# Patient Record
Sex: FEMALE | Race: WHITE
[De-identification: names, ages, dates, MRNs, and addresses within clinical notes are randomized per-mention and may not be internally consistent; named-entity substitution may affect disease eponyms.]

---

## 2017-09-11 ENCOUNTER — HOSPITAL ENCOUNTER (EMERGENCY)
Dept: HOSPITAL 63 - ER | Age: 82
Discharge: HOME | End: 2017-09-11
Payer: MEDICARE

## 2017-09-11 VITALS
SYSTOLIC BLOOD PRESSURE: 159 MMHG | SYSTOLIC BLOOD PRESSURE: 159 MMHG | DIASTOLIC BLOOD PRESSURE: 84 MMHG | SYSTOLIC BLOOD PRESSURE: 159 MMHG | DIASTOLIC BLOOD PRESSURE: 84 MMHG | SYSTOLIC BLOOD PRESSURE: 159 MMHG | DIASTOLIC BLOOD PRESSURE: 84 MMHG | DIASTOLIC BLOOD PRESSURE: 84 MMHG | DIASTOLIC BLOOD PRESSURE: 84 MMHG | SYSTOLIC BLOOD PRESSURE: 159 MMHG | SYSTOLIC BLOOD PRESSURE: 159 MMHG | SYSTOLIC BLOOD PRESSURE: 159 MMHG | DIASTOLIC BLOOD PRESSURE: 84 MMHG | DIASTOLIC BLOOD PRESSURE: 84 MMHG

## 2017-09-11 DIAGNOSIS — I10: ICD-10-CM

## 2017-09-11 DIAGNOSIS — R42: ICD-10-CM

## 2017-09-11 DIAGNOSIS — R19.7: Primary | ICD-10-CM

## 2017-09-11 DIAGNOSIS — E78.00: ICD-10-CM

## 2017-09-11 DIAGNOSIS — Z90.49: ICD-10-CM

## 2017-09-11 DIAGNOSIS — E11.9: ICD-10-CM

## 2017-09-11 LAB
ALBUMIN SERPL-MCNC: 3.4 G/DL (ref 3.4–5)
ALBUMIN/GLOB SERPL: 0.9 {RATIO} (ref 1–1.7)
ALP SERPL-CCNC: 78 U/L (ref 46–116)
ALT SERPL-CCNC: 17 U/L (ref 14–59)
ANION GAP SERPL CALC-SCNC: 10 MMOL/L (ref 6–14)
AST SERPL-CCNC: 15 U/L (ref 15–37)
BASOPHILS # BLD AUTO: 0 X10^3/UL (ref 0–0.2)
BASOPHILS NFR BLD: 1 % (ref 0–3)
BILIRUB SERPL-MCNC: 0.6 MG/DL (ref 0.2–1)
BUN/CREAT SERPL: 12 (ref 6–20)
CA-I SERPL ISE-MCNC: 16 MG/DL (ref 7–20)
CALCIUM SERPL-MCNC: 8.6 MG/DL (ref 8.5–10.1)
CHLORIDE SERPL-SCNC: 106 MMOL/L (ref 98–107)
CO2 SERPL-SCNC: 25 MMOL/L (ref 21–32)
CREAT SERPL-MCNC: 1.3 MG/DL (ref 0.6–1)
EOSINOPHIL NFR BLD: 0.1 X10^3/UL (ref 0–0.7)
EOSINOPHIL NFR BLD: 1 % (ref 0–3)
ERYTHROCYTE [DISTWIDTH] IN BLOOD BY AUTOMATED COUNT: 16.7 % (ref 11.5–14.5)
GFR SERPLBLD BASED ON 1.73 SQ M-ARVRAT: 39.3 ML/MIN
GLOBULIN SER-MCNC: 3.7 G/DL (ref 2.2–3.8)
GLUCOSE SERPL-MCNC: 227 MG/DL (ref 70–99)
HCT VFR BLD CALC: 36.4 % (ref 36–47)
HGB BLD-MCNC: 12.1 G/DL (ref 12–15.5)
LYMPHOCYTES # BLD: 0.9 X10^3/UL (ref 1–4.8)
LYMPHOCYTES NFR BLD AUTO: 12 % (ref 24–48)
MCH RBC QN AUTO: 29 PG (ref 25–35)
MCHC RBC AUTO-ENTMCNC: 33 G/DL (ref 31–37)
MCV RBC AUTO: 88 FL (ref 79–100)
MONO #: 0.9 X10^3/UL (ref 0–1.1)
MONOCYTES NFR BLD: 13 % (ref 0–9)
NEUT #: 5.4 X10^3UL (ref 1.8–7.7)
NEUTROPHILS NFR BLD AUTO: 74 % (ref 31–73)
PLATELET # BLD AUTO: 283 X10^3/UL (ref 140–400)
POTASSIUM SERPL-SCNC: 3.8 MMOL/L (ref 3.5–5.1)
PROT SERPL-MCNC: 7.1 G/DL (ref 6.4–8.2)
RBC # BLD AUTO: 4.13 X10^6/UL (ref 3.5–5.4)
SODIUM SERPL-SCNC: 141 MMOL/L (ref 136–145)
WBC # BLD AUTO: 7.3 X10^3/UL (ref 4–11)

## 2017-09-11 PROCEDURE — 80053 COMPREHEN METABOLIC PANEL: CPT

## 2017-09-11 PROCEDURE — 85025 COMPLETE CBC W/AUTO DIFF WBC: CPT

## 2017-09-11 PROCEDURE — 96360 HYDRATION IV INFUSION INIT: CPT

## 2017-09-11 PROCEDURE — 36415 COLL VENOUS BLD VENIPUNCTURE: CPT

## 2017-09-11 NOTE — PHYS DOC
Past History


Past Medical History:  Cancer, Diabetes, High Cholesterol, Hypertension, Other


Past Surgical History:  Appendectomy, Cholecystectomy, Knee Replacement


Alcohol Use:  None


Drug Use:  None





Adult General


Chief Complaint


Chief Complaint:  DIARRHEA





HPI


HPI





Patient is a 81 year old F who presents with diarrhea, dizziness and a fall 

yesterday. Rachel states that over the past 3-4 weeks she has been having loose 

stools that of an associated with dizziness. She denies abdominal pain or 

urinary difficulty. She has had normal stool studies at her primary care doctor'

s office. She was started on metformin just prior to the onset of diarrhea.





Review of Systems


Review of Systems





Constitutional: Denies fever or chills []


Eyes: Denies change in visual acuity, redness, or eye pain []


HENT: Denies nasal congestion or sore throat []


Respiratory: Denies cough or shortness of breath []


Cardiovascular: No additional information not addressed in HPI []


GI: Negative except history of present illness


: Denies dysuria or hematuria []


Musculoskeletal: Denies back pain or joint pain []


Integument: Denies rash or skin lesions []


Neurologic: Denies headache, focal weakness or sensory changes []


Endocrine: Denies polyuria or polydipsia []





Family History


Family History


Noncontributory





Current Medications


Current Medications





Current Medications








 Medications


  (Trade)  Dose


 Ordered  Sig/Isatu  Start Time


 Stop Time Status Last Admin


Dose Admin


 


 Sodium Chloride  1,000 ml @ 


 1,000 mls/hr  1X  ONCE  9/11/17 17:00


 9/11/17 17:59  9/11/17 17:10


1,000 MLS/HR











Allergies


Allergies





Allergies








Coded Allergies Type Severity Reaction Last Updated Verified


 


  No Known Drug Allergies    9/11/17 No











Physical Exam


Physical Exam





Constitutional: Well developed, well nourished, no acute distress, non-toxic 

appearance. []


HENT: Normocephalic, atraumatic, bilateral external ears normal, oropharynx 

moist, no oral exudates, nose normal. []


Eyes: PERRLA, EOMI, conjunctiva normal, no discharge. [] 


Neck: Normal range of motion, no tenderness, supple, no stridor. [] 


Cardiovascular:Heart rate regular rhythm


Lungs & Thorax:  Bilateral breath sounds clear to auscultation []


Abdomen: Bowel sounds normal, soft, no tenderness,


Skin: Warm, dry, no erythema, no rash. [] 


Back: No tenderness, no CVA tenderness. [] 


Extremities: No tenderness, no cyanosis, no clubbing, ROM intact, no edema. [] 


Neurologic: Alert and oriented X 3, normal motor function, normal sensory 

function, no focal deficits noted. []


Psychologic: Affect normal, judgement normal, mood normal. []





Current Patient Data


Vital Signs





 Vital Signs








  Date Time  Temp Pulse Resp B/P (MAP) Pulse Ox O2 Delivery O2 Flow Rate FiO2


 


9/11/17 16:37 97.0 86 20  97 Room Air  








Lab Results





 Laboratory Tests








Test


  9/11/17


16:30


 


White Blood Count


  7.3 x10^3/uL


(4.0-11.0)


 


Red Blood Count


  4.13 x10^6/uL


(3.50-5.40)


 


Hemoglobin


  12.1 g/dL


(12.0-15.5)


 


Hematocrit


  36.4 %


(36.0-47.0)


 


Mean Corpuscular Volume


  88 fL ()


 


 


Mean Corpuscular Hemoglobin 29 pg (25-35)  


 


Mean Corpuscular Hemoglobin


Concent 33 g/dL


(31-37)


 


Red Cell Distribution Width


  16.7 %


(11.5-14.5)  H


 


Platelet Count


  283 x10^3/uL


(140-400)


 


Neutrophils (%) (Auto) 74 % (31-73)  H


 


Lymphocytes (%) (Auto) 12 % (24-48)  L


 


Monocytes (%) (Auto) 13 % (0-9)  H


 


Eosinophils (%) (Auto) 1 % (0-3)  


 


Basophils (%) (Auto) 1 % (0-3)  


 


Neutrophils # (Auto)


  5.4 x10^3uL


(1.8-7.7)


 


Lymphocytes # (Auto)


  0.9 x10^3/uL


(1.0-4.8)  L


 


Monocytes # (Auto)


  0.9 x10^3/uL


(0.0-1.1)


 


Eosinophils # (Auto)


  0.1 x10^3/uL


(0.0-0.7)


 


Basophils # (Auto)


  0.0 x10^3/uL


(0.0-0.2)


 


Sodium Level


  141 mmol/L


(136-145)


 


Potassium Level


  3.8 mmol/L


(3.5-5.1)


 


Chloride Level


  106 mmol/L


()


 


Carbon Dioxide Level


  25 mmol/L


(21-32)


 


Anion Gap 10 (6-14)  


 


Blood Urea Nitrogen


  16 mg/dL


(7-20)


 


Creatinine


  1.3 mg/dL


(0.6-1.0)  H


 


Estimated GFR


(Cockcroft-Gault) 39.3  


 


 


BUN/Creatinine Ratio 12 (6-20)  


 


Glucose Level


  227 mg/dL


(70-99)  H


 


Calcium Level


  8.6 mg/dL


(8.5-10.1)


 


Total Bilirubin


  0.6 mg/dL


(0.2-1.0)


 


Aspartate Amino Transferase


(AST) 15 U/L (15-37)


 


 


Alanine Aminotransferase (ALT)


  17 U/L (14-59)


 


 


Alkaline Phosphatase


  78 U/L


()


 


Total Protein


  7.1 g/dL


(6.4-8.2)


 


Albumin


  3.4 g/dL


(3.4-5.0)


 


Albumin/Globulin Ratio


  0.9 (1.0-1.7)


L











Course & Med Decision Making


Course & Med Decision Making


Pertinent Labs and Imaging studies reviewed. (See chart for details)





Rachel's symptoms are most consistent with adverse reaction to metformin given 

her relatively normal labs and recent normal stool studies





Dragon Disclaimer


Dragon Disclaimer


This chart was dictated in whole or in part using Voice Recognition software in 

a busy, high-work load, and often noisy Emergency Department environment.  It 

may contain unintended and wholly unrecognized errors or omissions.





Departure


Departure:


Impression:  


 Primary Impression:  


 Diarrhea


Disposition:  01 HOME, SELF-CARE


Condition:  STABLE


Referrals:  


MAISHA VAZQUEZ (PCP)


Patient Instructions:  Diet for Diarrhea, Adult





Additional Instructions:  


Rachel was seen in the emergency room for diarrhea. No emergency medical 

condition was found on history or physical exam. She had normal labs. Her 

symptoms are most consistent with an adverse reaction to metformin. She was 

advised to hold her metformin until she discusses it with her primary care 

doctor. She is advised to take daily fiber supplementation. If she develops any 

signs of constipation she was encouraged to discontinue fiber supplementation 

and immediately. She was advised follow-up with her primary care doctor in the 

next week for further management of her current condition as well as diabetes.





Problem Qualifiers








 Primary Impression:  


 Diarrhea


 Diarrhea type:  unspecified type  Qualified Codes:  R19.7 - Diarrhea, 

unspecified








MARVA BERGMAN MD Sep 11, 2017 17:52

## 2017-09-26 ENCOUNTER — HOSPITAL ENCOUNTER (OUTPATIENT)
Dept: HOSPITAL 63 - SURG | Age: 82
Discharge: HOME | End: 2017-09-26
Payer: MEDICARE

## 2017-09-26 DIAGNOSIS — K52.9: ICD-10-CM

## 2017-09-26 DIAGNOSIS — E11.9: ICD-10-CM

## 2017-09-26 DIAGNOSIS — M19.91: ICD-10-CM

## 2017-09-26 DIAGNOSIS — K63.89: ICD-10-CM

## 2017-09-26 DIAGNOSIS — I10: ICD-10-CM

## 2017-09-26 DIAGNOSIS — K57.30: Primary | ICD-10-CM

## 2017-09-26 PROCEDURE — 88305 TISSUE EXAM BY PATHOLOGIST: CPT

## 2017-09-26 PROCEDURE — 45380 COLONOSCOPY AND BIOPSY: CPT

## 2017-09-26 PROCEDURE — 82947 ASSAY GLUCOSE BLOOD QUANT: CPT

## 2017-09-26 PROCEDURE — 87324 CLOSTRIDIUM AG IA: CPT

## 2017-09-26 PROCEDURE — 87177 OVA AND PARASITES SMEARS: CPT

## 2017-09-26 PROCEDURE — 36415 COLL VENOUS BLD VENIPUNCTURE: CPT

## 2017-09-26 PROCEDURE — 87045 FECES CULTURE AEROBIC BACT: CPT

## 2017-09-27 NOTE — PATHOLOGY
PATHOLOGY REPORT 

 

*    *    *    *    *    *    *    * 

 FINAL DIAGNOSIS:

Colon, 50 cm, biopsy:

- Chronic colitis with acute activity and crypt abscesses.

- Focal superficial erosions.

- No granulomata seen.

 (Please see comment). 

 

COMMENT:

The findings in this case are consistent with a chronic inflammatory

bowel disease such as ulcerative colitis.  No granulomata are seen. 

No evidence of dysplasia is seen.

(SKM:mgr; 2017) 

 

 

REPORT ELECTRONICALLY SIGNED BY:   ELIA Bass M.D.

DATE/TIME:   2017 13:22

*    *    *    *    *    *    *    *

 

GROSS PATHOLOGY:

Received in formalin labeled "Tory Mart, biopsy at 50 cm," are 3

segments of tan soft tissue measuring from 0.2 up to 0.4 cm in

maximum dimension.  The specimen is submitted entirely in cassette A1.

([facility]; 17)

 

 

 INITIAL CPT CODE(S):

A; 01177

Professional services performed by [facility] [place] 

[place] [address]

 

  Technical services performed by [facility] at [address] [place] [address].

  

 SPECIMEN(S) RECEIVED:

A.Biopsy at 50cm

 

 CLINICAL HISTORY:

Diarrhea; colitis, diverticulitis 

 

 PATIENT:  TORY MART

/AGE:  1935 (Age: 81)  

PATIENT #:  [id number]

ACCESSION #:  [id number]          ALT CASE #:   

SPECIMEN COLLECTION DATE:  2017

SPECIMEN RECEIVED DATE:  2017

   

[facility] - [address] [place] [address] - PHONE: 

[phone]

* * *  END OF REPORT  * * *

## 2017-12-12 ENCOUNTER — HOSPITAL ENCOUNTER (OUTPATIENT)
Dept: HOSPITAL 63 - ECHO | Age: 82
Discharge: HOME | End: 2017-12-12
Payer: MEDICARE

## 2017-12-12 ENCOUNTER — HOSPITAL ENCOUNTER (OUTPATIENT)
Dept: HOSPITAL 63 - MAMMO | Age: 82
Discharge: HOME | End: 2017-12-12
Attending: FAMILY MEDICINE
Payer: MEDICARE

## 2017-12-12 DIAGNOSIS — I37.1: ICD-10-CM

## 2017-12-12 DIAGNOSIS — E78.5: ICD-10-CM

## 2017-12-12 DIAGNOSIS — Z12.31: Primary | ICD-10-CM

## 2017-12-12 DIAGNOSIS — Z85.3: ICD-10-CM

## 2017-12-12 DIAGNOSIS — I10: ICD-10-CM

## 2017-12-12 DIAGNOSIS — E11.9: ICD-10-CM

## 2017-12-12 DIAGNOSIS — I35.0: Primary | ICD-10-CM

## 2017-12-12 PROCEDURE — 93306 TTE W/DOPPLER COMPLETE: CPT

## 2017-12-12 PROCEDURE — 77063 BREAST TOMOSYNTHESIS BI: CPT

## 2017-12-12 NOTE — CARD
--------------- APPROVED REPORT --------------





EXAM: Two-dimensional and M-mode echocardiogram with Doppler and color Doppler.



Other Information 

Quality : GoodHR: 84bpm



INDICATION

Aortic Valve Disease



RISK FACTORS

Hypertension 

Hyperlipidemia

Family History 

Diabetes



2D DIMENSIONS 

RVDd1.1 (2.9-3.5cm)Left Atrium(2D)4.3 (1.6-4.0cm)

IVSd1.1 (0.7-1.1cm)Aortic Root(2D)2.6 (2.0-3.7cm)

LVDd5.6 (3.9-5.9cm)LVOT Diameter1.9 (1.8-2.4cm)

PWd1.3 (0.7-1.1cm)LA Mgosti762 (18-58mL)

LVDs4.2 (2.5-4.0cm)FS (%) 25.8 %

SV77.9 mlLVEF(%)50.1 (>50%)



Aortic Valve

AoV Peak Jamari.301.6cm/sAoV VTI68.2cm

AO Peak GR.36.4mmHgLVOT Peak Jamari.112.6cm/s

AO Mean GR.20mmHgAVA (VMAX)1.11cm2

AI P 1/2 [id number]



Mitral Valve

MV E Eetobsnx93.9cm/sMV DECEL [id number]

MV A Xihnlauv716.3cm/sE/A  Ratio0.5



Pulmonary Valve

PV Peak Vykeseen741.5cm/sPV Peak Grad.16mmHg



Pulmonary Vein

S1 Gnskgljd83.8cm/sD2 Bkhyiwnv72.7cm/s



 LEFT VENTRICLE 

The left ventricle is normal size. There is borderline to mild concentric left ventricular hypertroph
y. The left ventricular systolic function is normal and the ejection fraction is within normal range.
 EF 55% There is normal LV segmental wall motion. Transmitral Doppler flow pattern is Grade I-abnorma
l relaxation pattern.



 RIGHT VENTRICLE 

The right ventricle is normal size. The right ventricular systolic function is normal.



 ATRIA 

The left atrium is moderately dilated. The right atrium size is normal. The interatrial septum is int
act with no evidence for an atrial septal defect or patent foramen ovale as noted on 2-D or Doppler i
ar.



 AORTIC VALVE 

The aortic valve is moderately calcified. Doppler and Color Flow revealed mild aortic regurgitation. 
There is moderate to severe valvular aortic stenosis. MG 20 mm Hg, FAROOQ 1.1 cm2, DI of 0.33



 MITRAL VALVE 

The mitral valve is calcified and displays decreased opening. There is no evidence of mitral valve pr
olapse. There is no mitral valve stenosis. Doppler and Color-flow revealed trace to mild mitral regur
gitation.



 TRICUSPID VALVE 

The tricuspid valve is normal in structure and function. Doppler and Color Flow revealed no tricuspid
 valve regurgitation noted. There is no tricuspid valve prolapse or vegetation. There is no tricuspid
 valve stenosis.



 PULMONIC VALVE 

The pulmonary valve is normal in structure and function. Doppler and Color Flow revealed mild pulmoni
c valvular regurgitation.



 GREAT VESSELS 

The aortic root is normal in size. The IVC is normal in size and collapses >50% with inspiration.



 PERICARDIAL EFFUSION 

There is no pleural effusion. There is no evidence of significant pericardial effusion.



Critical Notification

Critical Value: No



<Conclusion>

The left ventricular systolic function is normal and the ejection fraction is within normal range. EF
 55%

There is normal LV segmental wall motion.

There is moderate to severe valvular aortic stenosis. MG 20 mm Hg, FAROOQ 1.1 cm2, DI of 0.33

## 2017-12-22 NOTE — RAD
DATE: 12/12/2017



EXAM: MAMMO MIRNA SCREENING BILATERAL



Bilateral digital screening mammography to include digital breast

tomosynthesis (3D mammography) 



HISTORY: Screening study. History of breast cancer.



COMPARISON: 8/20/2014



This study was interpreted with the benefit of Computerized Aided Detection

(CAD).







The breast parenchyma shows scattered fibroglandular densities. Breast

parenchyma level B.







FINDINGS: Digital MLO and CC mammograms of both breasts were obtained.

Additionally digital breast tomosynthesis (3D mammography) images of both

breasts in the MLO and CC projections were performed. Comparison is made with

patient's outside mammograms from [place], [place] dated 8/20/2014.



The breast parenchyma is composed of scattered fibroglandular densities which

can obscure a lesion on mammography (breast density code B). No spiculated

mass is seen. Postsurgical changes are seen involving the right breast.

Surgical clips are seen within the right axilla. An area of scarring is seen

involving the right breast, unchanged. Benign-appearing and vascular

calcifications are seen scattered throughout both breasts. No malignant

appearing calcification is definitely seen.



Digital breast tomosynthesis images demonstrate no spiculated mass or

malignant appearing calcification.



Since the previous examination there has been no significant interval change.







IMPRESSION: BI-RADS Category 2: Benign findings. There is no mammographic

evidence of malignancy. Routine yearly screening mammography is recommended

for follow-up.







BI-RADS CATEGORY: 2 BENIGN FINDING(S)



RECOMMENDED FOLLOW-UP: 12M 12 MONTH FOLLOW-UP



PQRS compliance statement: Patient information was entered into a reminder

system with a target due date 12/12/2018 for the next mammogram.



Mammography is a sensitive method for finding small breast cancers, but it

does not detect them all and is not a substitute for careful clinical

examination.  A negative mammogram does not negate a clinically suspicious

finding and should not result in delay in biopsying a clinically suspicious

abnormality.



"Our facility is accredited by the [employer] Mammography

Program."

## 2018-07-17 ENCOUNTER — HOSPITAL ENCOUNTER (OUTPATIENT)
Dept: HOSPITAL 63 - ECHO | Age: 83
Discharge: HOME | End: 2018-07-17
Payer: MEDICARE

## 2018-07-17 DIAGNOSIS — I35.0: Primary | ICD-10-CM

## 2018-07-17 DIAGNOSIS — Z85.3: ICD-10-CM

## 2018-07-17 DIAGNOSIS — E78.5: ICD-10-CM

## 2018-07-17 DIAGNOSIS — E78.00: ICD-10-CM

## 2018-07-17 DIAGNOSIS — E11.9: ICD-10-CM

## 2018-07-17 DIAGNOSIS — I10: ICD-10-CM

## 2018-07-17 PROCEDURE — 93306 TTE W/DOPPLER COMPLETE: CPT

## 2018-07-17 NOTE — CARD
MR#: [id number]

Account#: [id number]

Accession#: [id number]

Date of Study: 07/17/2018

Ordering Physician: RE TRUJILLO, 

Referring Physician: RE TRUJILLO, 

Tech: ALIX Price





--------------- APPROVED REPORT --------------





EXAM: Two-dimensional and M-mode echocardiogram with Doppler and color Doppler.



Other Information 

Quality : AverageHR: 63bpm



INDICATION

Aortic Valve Disease

Atrial Fibrillation



RISK FACTORS

Hypertension 



2D DIMENSIONS 

RVDd3.5 (2.9-3.5cm)Left Atrium(2D)4.1 (1.6-4.0cm)

IVSd1.5 (0.7-1.1cm)Aortic Root(2D)3.0 (2.0-3.7cm)

LVDd4.9 (3.9-5.9cm)LVOT Diameter1.9 (1.8-2.4cm)

PWd1.7 (0.7-1.1cm)LVDs3.3 (2.5-4.0cm)

FS (%) 32.0 %SV68.4 ml

LVEF(%)60.0 (>50%)



Aortic Valve

AoV Peak Jamari.374.8cm/sAoV HGL911.5cm

AO Peak GR.56.2mmHgLVOT Peak Jamari.92.2cm/s

LVOT  VTI 27.72cmAO Mean GR.31mmHg

FAROOQ (VMAX)0.23ox2JCC   (VTI)0.67cm2

AI P 1/2 [id number]



Mitral Valve

MV E Kbbtayzl543.7cm/sMV DECEL [id number]

MV A Dbabfggl427.2cm/sE/A  Ratio0.9



Pulmonary Valve

PV Peak Mjfwsdju501.7cm/sPV Peak Grad.12mmHg



Pulmonary Vein

S1 Ttcjienx43.4cm/sD2 Diqpssha42.9cm/s



 LEFT VENTRICLE 

The left ventricle is normal size. There is moderate concentric left ventricular hypertrophy. The lef
t ventricular systolic function is normal and the ejection fraction is within normal range. EF 55% Th
ere is normal LV segmental wall motion. The left ventricular diastolic function and filling is normal
 for age.



 RIGHT VENTRICLE 

The right ventricle is borderline dilated. The right ventricular systolic function is normal.



 ATRIA 

The left atrium is mildly dilated. The right atrium size is normal. The interatrial septum is intact 
with no evidence for an atrial septal defect or patent foramen ovale as noted on 2-D or Doppler imagi
ng.



 AORTIC VALVE 

The aortic valve is moderately to severely calcified. Doppler and Color Flow revealed trace aortic re
gurgitation. Calculated aortic valve area is 0.8 cm2 with maximum pressure gradient of 56.18 mmHg and
 mean pressure gradient of 30.97 Doppler and color-flow analysis revealed moderate aortic stenosis. V
isually, the valve is probably severely stenotic. There is no aortic valvular vegetation.



 MITRAL VALVE 

The mitral valve leaflets are thickened and calcified. Restricted posterior leaflet motion. There is 
no evidence of mitral valve prolapse. There is no mitral valve stenosis. Doppler and Color-flow revea
led trace mitral regurgitation.



 TRICUSPID VALVE 

The tricuspid valve is normal in structure. Doppler and Color Flow revealed no tricuspid valve regurg
itation noted. There is no tricuspid valve prolapse or vegetation. There is no tricuspid valve stenos
is.



 PULMONIC VALVE 

The pulmonic valve is not well visualized. Doppler and Color Flow revealed mild pulmonic valvular reg
urgitation. There is no pulmonic valvular stenosis.



 GREAT VESSELS 

The aortic root is normal in size. The IVC is dilated. The IVC collapses <50% with inspiration.



 PERICARDIAL EFFUSION 

There is no pleural effusion. There is no evidence of significant pericardial effusion.



Critical Notification

Critical Value: No



<Conclusion>

The left ventricular systolic function is normal and the ejection fraction is within normal range. EF
 55%

There is normal LV segmental wall motion.

Calculated aortic valve area is 0.8 cm2 with maximum pressure gradient of 56.18 mmHg and mean pressur
e gradient of 30.97 Doppler and color-flow analysis revealed moderate aortic stenosis. Visually, the 
valve is probably severely stenotic.



Signed by : Re Trujillo, 

Electronically Approved : 07/17/2018 10:08:18

## 2018-08-02 ENCOUNTER — HOSPITAL ENCOUNTER (OUTPATIENT)
Dept: HOSPITAL 61 - CCL | Age: 83
Discharge: HOME | End: 2018-08-02
Attending: INTERNAL MEDICINE
Payer: MEDICARE

## 2018-08-02 DIAGNOSIS — Z98.49: ICD-10-CM

## 2018-08-02 DIAGNOSIS — Z79.84: ICD-10-CM

## 2018-08-02 DIAGNOSIS — Z85.3: ICD-10-CM

## 2018-08-02 DIAGNOSIS — Z86.718: ICD-10-CM

## 2018-08-02 DIAGNOSIS — E11.9: ICD-10-CM

## 2018-08-02 DIAGNOSIS — Z90.710: ICD-10-CM

## 2018-08-02 DIAGNOSIS — Z79.899: ICD-10-CM

## 2018-08-02 DIAGNOSIS — Z79.01: ICD-10-CM

## 2018-08-02 DIAGNOSIS — Z96.653: ICD-10-CM

## 2018-08-02 DIAGNOSIS — I10: ICD-10-CM

## 2018-08-02 DIAGNOSIS — M19.90: ICD-10-CM

## 2018-08-02 DIAGNOSIS — I35.0: Primary | ICD-10-CM

## 2018-08-02 DIAGNOSIS — Z90.49: ICD-10-CM

## 2018-08-02 LAB
ANION GAP SERPL CALC-SCNC: 7 MMOL/L (ref 6–14)
BLOOD UREA NITROGEN: 29 MG/DL (ref 7–20)
CALCIUM: 9.1 MG/DL (ref 8.5–10.1)
CHLORIDE: 105 MMOL/L (ref 98–107)
CO2 SERPL-SCNC: 27 MMOL/L (ref 21–32)
CREAT SERPL-MCNC: 1.4 MG/DL (ref 0.6–1)
GFR SERPLBLD BASED ON 1.73 SQ M-ARVRAT: 36 ML/MIN
GLUCOSE SERPL-MCNC: 192 MG/DL (ref 70–99)
HCG SERPL-ACNC: 8.9 X10^3/UL (ref 4–11)
HEMATOCRIT: 37.6 % (ref 36–47)
HEMOGLOBIN: 12.5 G/DL (ref 12–15.5)
INR: 1.4 (ref 0.8–1.1)
MEAN CORPUSCULAR HEMOGLOBIN: 29 PG (ref 25–35)
MEAN CORPUSCULAR HGB CONC: 33 G/DL (ref 31–37)
MEAN CORPUSCULAR VOLUME: 86 FL (ref 79–100)
PARTIAL THROMBOPLASTIN TIME: 24 SEC (ref 24–38)
PLATELET COUNT: 283 X10^3/UL (ref 140–400)
POTASSIUM SERPL-SCNC: 4.9 MMOL/L (ref 3.5–5.1)
PROTHROMBIN TIME PATIENT: 17 SEC (ref 11.7–14)
RED BLOOD COUNT: 4.38 X10^6/UL (ref 3.5–5.4)
RED CELL DISTRIBUTION WIDTH: 15.4 % (ref 11.5–14.5)
SODIUM: 139 MMOL/L (ref 136–145)

## 2018-08-02 PROCEDURE — 80048 BASIC METABOLIC PNL TOTAL CA: CPT

## 2018-08-02 PROCEDURE — 99152 MOD SED SAME PHYS/QHP 5/>YRS: CPT

## 2018-08-02 PROCEDURE — 93460 R&L HRT ART/VENTRICLE ANGIO: CPT

## 2018-08-02 PROCEDURE — 85027 COMPLETE CBC AUTOMATED: CPT

## 2018-08-02 PROCEDURE — 85730 THROMBOPLASTIN TIME PARTIAL: CPT

## 2018-08-02 PROCEDURE — 85610 PROTHROMBIN TIME: CPT

## 2018-08-02 PROCEDURE — 36415 COLL VENOUS BLD VENIPUNCTURE: CPT

## 2018-08-02 PROCEDURE — 99153 MOD SED SAME PHYS/QHP EA: CPT

## 2018-08-02 RX ADMIN — FENTANYL CITRATE 1 MCG: 50 INJECTION INTRAMUSCULAR; INTRAVENOUS at 10:54

## 2018-08-02 RX ADMIN — MIDAZOLAM HYDROCHLORIDE 1 MG: 1 INJECTION, SOLUTION INTRAMUSCULAR; INTRAVENOUS at 10:53

## 2018-08-02 RX ADMIN — LIDOCAINE HYDROCHLORIDE 1 ML: 20 INJECTION, SOLUTION INFILTRATION; PERINEURAL at 10:53

## 2018-08-02 RX ADMIN — IODIXANOL 1 ML: 320 INJECTION, SOLUTION INTRAVASCULAR at 10:53

## 2018-08-02 RX ADMIN — HEPARIN SODIUM IN SODIUM CHLORIDE 1 UNIT: 200 INJECTION INTRAVENOUS at 10:53

## 2018-09-07 ENCOUNTER — HOSPITAL ENCOUNTER (OUTPATIENT)
Dept: HOSPITAL 63 - US | Age: 83
Discharge: HOME | End: 2018-09-07
Payer: MEDICARE

## 2018-09-07 DIAGNOSIS — I82.492: Primary | ICD-10-CM

## 2018-09-07 DIAGNOSIS — E78.5: ICD-10-CM

## 2018-09-07 DIAGNOSIS — E11.9: ICD-10-CM

## 2018-09-07 DIAGNOSIS — M19.91: ICD-10-CM

## 2018-09-07 DIAGNOSIS — Z85.3: ICD-10-CM

## 2018-09-07 DIAGNOSIS — Z90.49: ICD-10-CM

## 2018-09-07 DIAGNOSIS — E78.00: ICD-10-CM

## 2018-09-07 DIAGNOSIS — I10: ICD-10-CM

## 2018-09-07 PROCEDURE — 93970 EXTREMITY STUDY: CPT

## 2018-09-07 NOTE — RAD
MR#: [id number]

Account#: [id number]

Accession#: [id number]

Date of Study: 09/07/2018

Ordering Physician: RE TRUJILLO, 

Referring Physician: RE TRUJILLO, 

Tech: Marlee Quintana RDMS, RVT, RTR





--------------- APPROVED REPORT --------------





Bilateral Lower Extremity Venous Study for DVT, Venous Competence

Patient Location: OUT-PATIENT



Indications

Lower Extremity Pain: Bilateral                                                                      
                                                            

Lower Extremity Edema:     Bilateral                                                                 
                                                                 

DVT of Lower Extremity:Left                                                                          
                                       

The bilateral lower extremity deep veins were evaluated for thrombus with color Doppler, spectral and
 grayscale images.



On the right the grayscale images of the common femoral, superficial femoral and popliteal veins do n
ot demonstrate any evidence of thrombus and these veins appear to be compressible. The below-knee vei
ns were not well visualized but grossly appear to be compressible. Spectral imaging and color Doppler
 do not reveal any evidence of obstruction to flow with normal respirophasic variation above the knee
. Below the knee there is spontaneous flow noted.



On the left, the grayscale images of the common femoral, superficial femoral and popliteal veins do n
ot demonstrate any evidence of thrombus and these veins appear to be compressible. The below-knee vei
ns again were not well visualized but grossly appear to be compressible. Spectral imaging and color D
oppler do not reveal any evidence of obstruction to flow with normal respirophasic variation above th
e knee. The below-knee veins demonstrate spontaneous flow.



Critical Notification

Critical Value: No



<Conclusion>

No clear evidence of DVT in the bilateral lower extremities. 



Signed by : Re Trujillo, 

Electronically Approved : 09/07/2018 12:16:04

## 2018-09-07 NOTE — RAD
MR#: [id number]

Account#: [id number]

Accession#: [id number]

Date of Study: 09/07/2018

Ordering Physician: RE TRUJILLO, 

Referring Physician: RE TRUJILLO, 

Tech: Marlee Quintana RDMS, RVT, RTR





--------------- APPROVED REPORT --------------





Patient Location : OUT-PATIENT



Indications

Lower Extremity Pain : Bilateral

Lower Extremity Edema :     Bilateral

Skin Changes

Bilateral greater and lesser saphenous veins were imaged. The bilateral greater and lesser saphenous 
veins demonstrate reflux of more than 1.5 seconds. The right great saphenous vein has reflux time of 
4.4 seconds and the left great saphenous vein has reflux time of 3.6 seconds.



Deep System

Deep Venous Thrombosis present : Left

Deep Venous Reflux present : Bilateral



Greater Saphenous Veins (GSV)

Significant venous relux noted in the RIGHT GSV at the following levels : Superficial Femoral Junctio
n, Proximal Thigh, Mid Thigh, Distal Thigh, Proximal Calf, Mid Calf, Distal Calf

Significant venous relux noted in the LEFT GSV at the following levels : Superficial Femoral Junction
, Proximal Thigh, Mid Thigh, Distal Thigh, Proximal Calf, Mid Calf, Distal Calf



Lesser Saphenous Veins (LSV)

Significant venous reflux is noted in the Bilateral LSV.



Critical Notification

Critical Value: No



<Conclusion>

Positive for reflux in the bilateral greater and lesser saphenous veins.



Signed by : Re Trujillo, 

Electronically Approved : 09/07/2018 12:10:09

## 2020-03-10 ENCOUNTER — HOSPITAL ENCOUNTER (OUTPATIENT)
Dept: HOSPITAL 63 - ECHO | Age: 85
Discharge: HOME | End: 2020-03-10
Payer: MEDICARE

## 2020-03-10 DIAGNOSIS — I25.10: ICD-10-CM

## 2020-03-10 DIAGNOSIS — I08.0: Primary | ICD-10-CM

## 2020-03-10 PROCEDURE — 93306 TTE W/DOPPLER COMPLETE: CPT

## 2020-03-10 NOTE — CARD
MR#: [id number]

Account#: [id number]

Accession#: [id number]

Date of Study: 03/10/2020

Ordering Physician: RE TRUJILLO, 

Referring Physician: RE TRUJILLO, 

Tech: Piper Willett MADI





--------------- APPROVED REPORT --------------





EXAM: Two-dimensional and M-mode echocardiogram with Doppler and color Doppler.



Other Information 

Quality : Good



INDICATION

Cardiac Disease: CAD 



2D DIMENSIONS 

RVDd2.5 (2.9-3.5cm)Left Atrium(2D)4.7 (1.6-4.0cm)

IVSd1.0 (0.7-1.1cm)Aortic Root(2D)2.6 (2.0-3.7cm)

LVDd5.5 (3.9-5.9cm)LVOT Diameter2.0 (1.8-2.4cm)

PWd1.1 (0.7-1.1cm)LVDs3.9 (2.5-4.0cm)

FS (%) 29.1 %SV82.4 ml

LVEF(%)55.3 (>50%)



M-Mode DIMENSIONS 

Aortic Cusp Exc1.03 (1.5-2.0cm)



Aortic Valve

AoV Peak Jamari.478.3cm/sAoV REQ615.4cm

AO Peak GR.91.5mmHgAO Mean GR.50mmHg

FAROOQ   (VTI)0.44cm2



Mitral Valve

MV E Rnuxpwls50.8cm/sMV E Peak Gr.8mmHg

MV DECEL [id number] A Qzforcke185.4cm/s

MV E Mean Gr.3mmHgE/A  Ratio0.7



 LEFT VENTRICLE 

The left ventricle is normal size. There is normal left ventricular wall thickness. Left ventricle sy
stolic function is low normal. The Ejection Fraction is 50-55%. Septal motion consistent with conduct
ion abnormality. Transmitral Doppler flow pattern is Grade I-abnormal relaxation pattern.



 RIGHT VENTRICLE 

The right ventricle is normal size. There is normal right ventricular wall thickness. The right ventr
icular systolic function is normal.



 ATRIA 

The left atrium size is normal. The right atrium size is normal. The interatrial septum is intact wit
h no evidence for an atrial septal defect or patent foramen ovale as noted on 2-D or Doppler imaging.




 AORTIC VALVE 

The aortic valve is calcified and displays decreased opening. Doppler and Color Flow revealed trace a
ortic regurgitation. Calculated aortic valve area is 0.44 cm2 with maximum pressure gradient of 92 mm
Hg and mean pressure gradient of 50 mmHg. Doppler and color-flow analysis revealed critical aortic st
enosis.



 MITRAL VALVE 

The mitral valve is calcified and displays decreased opening. Mitral annular calcification is moderat
e to severe. There is no evidence of mitral valve prolapse. There is mild mitral valve stenosis. Dopp
ler and Color-flow revealed trace mitral regurgitation.



 TRICUSPID VALVE 

The tricuspid valve is normal in structure and function. Doppler and Color Flow revealed no tricuspid
 valve regurgitation noted. There is no tricuspid valve stenosis.



 PULMONIC VALVE 

The pulmonic valve is not well visualized. Doppler and Color Flow revealed no pulmonic valvular regur
gitation. There is no pulmonic valvular stenosis.



 GREAT VESSELS 

The aortic root is normal in size. The ascending aorta is normal in size. The IVC is normal in size a
nd collapses >50% with inspiration.



 PERICARDIAL EFFUSION 

There is no evidence of significant pericardial effusion.



Critical Notification

Critical Value: No



<Conclusion>

Left ventricle systolic function is low normal. The Ejection Fraction is 50-55%.

Septal motion consistent with conduction abnormality.

Calculated aortic valve area is 0.44 cm2 with maximum pressure gradient of 92 mmHg and mean pressure 
gradient of 50 mmHg. Doppler and color-flow analysis revealed critical aortic stenosis.



Signed by : Re Trujillo, 

Electronically Approved : 03/10/2020 10:26:58